# Patient Record
Sex: FEMALE | Race: BLACK OR AFRICAN AMERICAN | NOT HISPANIC OR LATINO | Employment: STUDENT | URBAN - NONMETROPOLITAN AREA
[De-identification: names, ages, dates, MRNs, and addresses within clinical notes are randomized per-mention and may not be internally consistent; named-entity substitution may affect disease eponyms.]

---
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
## 2025-01-29 ENCOUNTER — OFFICE VISIT (OUTPATIENT)
Dept: OBSTETRICS AND GYNECOLOGY | Facility: CLINIC | Age: 20
End: 2025-01-29
Payer: MEDICAID

## 2025-01-29 VITALS
BODY MASS INDEX: 33.09 KG/M2 | SYSTOLIC BLOOD PRESSURE: 127 MMHG | WEIGHT: 192.81 LBS | DIASTOLIC BLOOD PRESSURE: 86 MMHG | HEART RATE: 71 BPM

## 2025-01-29 DIAGNOSIS — N89.8 VAGINA ITCHING: ICD-10-CM

## 2025-01-29 DIAGNOSIS — Z20.2 STD EXPOSURE: ICD-10-CM

## 2025-01-29 DIAGNOSIS — Z11.3 SCREEN FOR STD (SEXUALLY TRANSMITTED DISEASE): Primary | ICD-10-CM

## 2025-01-29 PROCEDURE — 1159F MED LIST DOCD IN RCRD: CPT | Mod: CPTII,,, | Performed by: OBSTETRICS & GYNECOLOGY

## 2025-01-29 PROCEDURE — 3008F BODY MASS INDEX DOCD: CPT | Mod: CPTII,,, | Performed by: OBSTETRICS & GYNECOLOGY

## 2025-01-29 PROCEDURE — 3079F DIAST BP 80-89 MM HG: CPT | Mod: CPTII,,, | Performed by: OBSTETRICS & GYNECOLOGY

## 2025-01-29 PROCEDURE — 99213 OFFICE O/P EST LOW 20 MIN: CPT | Mod: S$PBB,,, | Performed by: OBSTETRICS & GYNECOLOGY

## 2025-01-29 PROCEDURE — 3074F SYST BP LT 130 MM HG: CPT | Mod: CPTII,,, | Performed by: OBSTETRICS & GYNECOLOGY

## 2025-01-29 NOTE — PROGRESS NOTES
[place] 19-year-old sexually active female who complains of increased discharge no odor no itching periods she has not taken any new medication on pelvic examination just a slight whitish discharge GC chlamydia called Trichomonas culture was was taken bimanual difficult but normal

## 2025-01-30 LAB
CHLAMYDIA: NEGATIVE
GONORRHEA: NEGATIVE
SOURCE: NORMAL
SOURCE: NORMAL
TRICHOMONAS AMPLIFIED: NEGATIVE

## 2025-07-09 ENCOUNTER — E-VISIT (OUTPATIENT)
Dept: URGENT CARE | Facility: CLINIC | Age: 20
End: 2025-07-09
Payer: MEDICAID

## 2025-07-09 DIAGNOSIS — N89.8 VAGINAL DISCHARGE: Primary | ICD-10-CM

## 2025-07-09 NOTE — PROGRESS NOTES
Patient ID: Nancy Hendricks is a 20 y.o. female.        E-Visit Time Tracking:   Day 1 Time (in minutes): 6  Total Time (in minutes): 6      Chief Complaint: General Illness (Entered automatically based on patient selection in [employer].)      The patient initiated a request through [employer] on 7/9/2025 for evaluation and management with a chief complaint of General Illness (Entered automatically based on patient selection in [employer].)     I evaluated the questionnaire submission on 07/09/2025.    [facility]    7/9/2025  7:19 AM CDT - Filed by Patient   What do you need help with? Other Concern   Do you agree to participate in an E-Visit? Yes   If you have any of the following symptoms, please go to the nearest emergency room or call 911: I acknowledge   Do you have any of the following pregnancy-related conditions? (Pregnant, Possibly pregnant, Breast feeding, None) None   What is the main issue you would like addressed today? i never have a smell down there and all of a sudden i do now. also my discharge is abnormal. is it possible for me to get a full pannel test scheduled?   Please describe your symptoms. abnormal discharge and a weird smell   Where is your problem located? vaginal area   On a scale of 1-10, where 10 is the worst you can imagine, how severe are your symptoms? (range: 1 - 10) 6   Have you had these symptoms before? No   How long have you been having these symptoms? (Just today, For a few days, For a week, For one to four weeks, For more than a month) About a week   What helps with your symptoms? havent tried anything yet because im not sure whats wrong   What makes your symptoms feel worse? not sure   Are these symptoms related to a condition that you currently have? (Yes, No, Not sure) Not sure   Please describe any probable cause for your symptoms. probably from having unprotected sex   Provide any information you feel is important to your history not asked above     Please attach any relevant images or files    Are you able to take your vitals? No         Encounter Diagnosis   Name Primary?    Vaginal discharge Yes        Orders Placed This Encounter   Procedures    Trichomonas vaginalis, RNA, Qual     Standing Status:   Future     Expected Date:   7/9/2025     Expiration Date:   10/7/2026    C. trachomatis/N. gonorrhoeae by AMP DNA     Standing Status:   Future     Expected Date:   7/9/2025     Expiration Date:   7/10/2026     Source::   Urine    HIV 1/2 Ag/Ab (4th Gen)     Standing Status:   Future     Expected Date:   7/9/2025     Expiration Date:   9/7/2026     Release to patient:   Immediate    Treponema Pallidium Antibodies IgG, IgM     Standing Status:   Future     Expected Date:   7/9/2025     Expiration Date:   10/7/2026            No follow-ups on file.

## 2025-07-11 ENCOUNTER — TELEPHONE (OUTPATIENT)
Dept: FAMILY MEDICINE | Facility: CLINIC | Age: 20
End: 2025-07-11
Payer: MEDICAID

## 2025-07-11 NOTE — TELEPHONE ENCOUNTER
Copied from CRM #[id number]. Topic: General Inquiry - Patient Advice  >> Jul 11, 2025  3:19 PM Matilde Mendez wrote:  Type:  Needs Medical Advice    Who Called: pt   Would the patient rather a call back or a response via [employer]? call  Best Call Back Number: [phone]  Additional Information: pt states that she needs her lab results fax to this number [phone] has been calling since 1pm

## 2025-07-11 NOTE — TELEPHONE ENCOUNTER
Copied from CRM #[id number]. Topic: General Inquiry - Patient Advice  >> Jul 11, 2025  2:55 PM Judith wrote:  Type: Needs Medical Advice  Who Called:  pt     Best Call Back Number: [phone]    Additional Information: pt requesting call back needing her lab orders sent to her dr office at [phone] , pt has been waiting an hour please advise

## 2025-07-11 NOTE — TELEPHONE ENCOUNTER
Copied from CRM #[id number]. Topic: General Inquiry - Patient Advice  >> Jul 11, 2025  2:36 PM Vicki wrote:  Type: Orders Request    What orders/ testing are being requested?  Labs    Is there a future appointment scheduled for the patient with PCP?   No    When?  N/A    Would you prefer a response via [employer]?  Call back    [phone] - [facility] in [place], [place]    Comments:  States she spoke with someone a little bit ago and is waiting on orders to be faxed over - states patient is there waiting - please call - thank you

## 2025-07-15 ENCOUNTER — OFFICE VISIT (OUTPATIENT)
Dept: FAMILY MEDICINE | Facility: CLINIC | Age: 20
End: 2025-07-15
Payer: MEDICAID

## 2025-07-15 VITALS
HEIGHT: 64 IN | OXYGEN SATURATION: 99 % | TEMPERATURE: 98 F | SYSTOLIC BLOOD PRESSURE: 130 MMHG | DIASTOLIC BLOOD PRESSURE: 70 MMHG | BODY MASS INDEX: 34.08 KG/M2 | HEART RATE: 81 BPM | WEIGHT: 199.63 LBS

## 2025-07-15 DIAGNOSIS — M79.89 LEFT LEG SWELLING: Primary | ICD-10-CM

## 2025-07-15 DIAGNOSIS — G47.00 INSOMNIA, UNSPECIFIED TYPE: ICD-10-CM

## 2025-07-15 PROCEDURE — 1160F RVW MEDS BY RX/DR IN RCRD: CPT | Mod: CPTII,,, | Performed by: NURSE PRACTITIONER

## 2025-07-15 PROCEDURE — 3008F BODY MASS INDEX DOCD: CPT | Mod: CPTII,,, | Performed by: NURSE PRACTITIONER

## 2025-07-15 PROCEDURE — 99213 OFFICE O/P EST LOW 20 MIN: CPT | Mod: ,,, | Performed by: NURSE PRACTITIONER

## 2025-07-15 PROCEDURE — 1159F MED LIST DOCD IN RCRD: CPT | Mod: CPTII,,, | Performed by: NURSE PRACTITIONER

## 2025-07-15 PROCEDURE — 3078F DIAST BP <80 MM HG: CPT | Mod: CPTII,,, | Performed by: NURSE PRACTITIONER

## 2025-07-15 PROCEDURE — G2211 COMPLEX E/M VISIT ADD ON: HCPCS | Mod: ,,, | Performed by: NURSE PRACTITIONER

## 2025-07-15 PROCEDURE — 3075F SYST BP GE 130 - 139MM HG: CPT | Mod: CPTII,,, | Performed by: NURSE PRACTITIONER

## 2025-07-15 RX ORDER — BUTALBITAL, ACETAMINOPHEN AND CAFFEINE 50; 325; 40 MG/1; MG/1; MG/1
1 CAPSULE ORAL
COMMUNITY
Start: 2024-11-12

## 2025-07-15 RX ORDER — HYDROXYZINE HYDROCHLORIDE 10 MG/1
10 TABLET, FILM COATED ORAL NIGHTLY PRN
Qty: 30 TABLET | Refills: 3 | Status: SHIPPED | OUTPATIENT
Start: 2025-07-15

## 2025-07-15 NOTE — PROGRESS NOTES
Patient ID: [id number]     Chief Complaint: Edema      Subjective     Nancy Hendricks is a 20 y.o. female in the office for Edema      History of Present Illness    CHIEF COMPLAINT:  Patient presents today for left leg swelling and discoloration.    VASCULAR CONCERNS:  She reports chronic left leg swelling persisting for approximately two years. The swelling fluctuates in severity but never fully resolves. The left foot experiences discoloration with a purple toe suggesting compromised circulation. She experiences intermittent pain in the back of the leg, exacerbated by walking or standing. Swelling temporarily improves with bed rest. She currently uses compression stockings for management. When swelling is significant, it causes discomfort and limits mobility. The edema pattern is characterized by persistent swelling that increases in size without significant volume reduction. She recently visited the ER on Saturday in July due to significant leg swelling that had persisted for about a week.    PERIPHERAL CIRCULATION:  She reports bilateral foot symptoms including purple discoloration of toes and cold sensations. She notes abnormal toe positioning, specifically describing a toe that appears bent downward and then curves back up. She denies warmth in the affected areas.    SLEEP:  She reports significant difficulty initiating sleep despite profound fatigue, obtaining only approximately 3 hours of sleep nightly. She describes having an active and racing mind when attempting to fall asleep. She has a history of using sleep medication when younger, which was previously managed with counseling, but currently takes no sleep-related medications.    ALLERGIES:  She reports worsening crawfish allergy symptoms. Previously managed with Benadryl, she now experiences hand swelling with white spots upon contact with crawfish. Due to escalating allergic response, she has decided to completely avoid crawfish.    VITAL  "SIGNS:  During her recent ER visit, her blood pressure was initially elevated, subsequently measuring low during the same encounter.      ROS:  ROS as indicated in HPI.         Past Medical History:  has a past medical history of Abnormal Pap smear of cervix, Anxiety, Impaired fasting glucose, Obesity, unspecified, and Obesity, unspecified.    Social History:  reports that she has never smoked. She has never been exposed to tobacco smoke. She has never used smokeless tobacco. She reports that she does not drink alcohol and does not use drugs.    Current Outpatient Medications   Medication Instructions    butalbital-acetaminophen-caff -40 mg -40 mg Cap 1 capsule, As needed (PRN)    hydrOXYzine HCL (ATARAX) 10 mg, Oral, Nightly PRN       Patient is allergic to shellfish containing products and iodine.     Patient Care Team:  Miroslava Welch FNP-C as PCP - General (Family Medicine)  [facility])  Verónica Phoenix WHNP as Nurse Practitioner (Obstetrics and Gynecology)     Objective     Visit Vitals  /70 (Patient Position: Sitting)   Pulse 81   Temp 97.5 °F (36.4 °C)   Ht 5' 4" (1.626 m)   Wt 90.5 kg (199 lb 9.6 oz)   LMP 2025   SpO2 99%   BMI 34.26 kg/m²       Physical Exam  Vitals reviewed.   Constitutional:       General: She is not in acute distress.  Cardiovascular:      Rate and Rhythm: Normal rate and regular rhythm.      Pulses: No decreased pulses.      Heart sounds: Normal heart sounds.   Pulmonary:      Effort: Pulmonary effort is normal.      Breath sounds: Normal breath sounds.   Musculoskeletal:      Left lower le+ Edema present.   Skin:     General: Skin is warm and dry.   Neurological:      Mental Status: She is alert and oriented to person, place, and time.   Psychiatric:         Mood and Affect: Mood normal.         Assessment & Plan     1. Left leg swelling  -     Ambulatory referral/consult to Vascular Surgery; Future; Expected date: 2025    2. Insomnia, " unspecified type  -     hydrOXYzine HCL (ATARAX) 10 MG Tab; Take 1 tablet (10 mg total) by mouth nightly as needed (sleep).  Dispense: 30 tablet; Refill: 3         Assessment & Plan    M79.89 Left leg swelling  G47.00 Insomnia, unspecified type    IMPRESSION:  - Assessed leg swelling and discoloration, noting chronic nature and recent exacerbation requiring hospital visit.  - Evaluated BP fluctuations reported from recent ER visit.  - Considered vascular issues as potential cause of leg symptoms and discoloration.  - Determined need for further cardiovascular evaluation.  - Confirmed likely allergic reaction to crawfish.    M79.89 LEFT LEG SWELLING:  - Discussed benefits of compression stockings for leg swelling and patient to continue wearing them.  - Referred to vascular surgery for further evaluation of leg symptoms with follow up appointment in approximately 2 weeks.  - Patient to contact office if no one calls to schedule vascular surgery appointment within 2 weeks.  - Also referred back to cardiology for cardiovascular evaluation.    G47.00 INSOMNIA, UNSPECIFIED TYPE:  - Started hydroxyzine for sleep difficulties.  - Patient to contact office if medication does not help.        Again, referring to cardiology for workup venous insufficiency, rule out May Campbell syndrome.  She is a family history of early CAD.  Again encouraged compression stockings      Follow up if symptoms worsen or fail to improve. In addition to their next scheduled appointment, the patient has also been instructed to follow up on as needed basis.     Future Appointments   Date Time Provider Department Center   7/29/2025  9:30 AM Verónica Phoenix WHNP [facility]        This note was generated with the assistance of ambient listening technology. Verbal consent was obtained by the patient and accompanying visitor(s) for the recording of patient appointment to facilitate this note. I attest to having reviewed and edited the generated  note for accuracy, though some syntax or spelling errors may persist. Please contact the author of this note for any clarification.

## 2025-07-16 ENCOUNTER — PATIENT MESSAGE (OUTPATIENT)
Dept: FAMILY MEDICINE | Facility: CLINIC | Age: 20
End: 2025-07-16
Payer: MEDICAID

## 2025-07-16 ENCOUNTER — E-VISIT (OUTPATIENT)
Dept: URGENT CARE | Facility: CLINIC | Age: 20
End: 2025-07-16
Payer: MEDICAID

## 2025-07-16 DIAGNOSIS — R21 RASH: ICD-10-CM

## 2025-07-16 DIAGNOSIS — L70.0 ACNE VULGARIS: Primary | ICD-10-CM

## 2025-07-16 RX ORDER — DOXYCYCLINE 100 MG/1
100 CAPSULE ORAL EVERY 12 HOURS
Qty: 20 CAPSULE | Refills: 0 | Status: SHIPPED | OUTPATIENT
Start: 2025-07-16 | End: 2025-07-26

## 2025-07-16 RX ORDER — CLINDAMYCIN PHOSPHATE AND BENZOYL PEROXIDE 10; 50 MG/G; MG/G
GEL TOPICAL
Qty: 45 G | Refills: 0 | Status: SHIPPED | OUTPATIENT
Start: 2025-07-16 | End: 2026-07-16

## 2025-07-16 NOTE — PROGRESS NOTES
Patient ID: Nancy Hendricks is a 20 y.o. female.        E-Visit Time Tracking:   Day 1 Time (in minutes): 12  Total Time (in minutes): 12      Chief Complaint: General Illness (Entered automatically based on patient selection in [employer].)      The patient initiated a request through [employer] on 7/16/2025 for evaluation and management with a chief complaint of General Illness (Entered automatically based on patient selection in [employer].)     I evaluated the questionnaire submission on 07/16/2025.    [facility]    7/16/2025  9:04 AM CDT - Filed by Patient   What do you need help with? Skin   What concern do you have about your skin? Itching   Do you agree to participate in an E-Visit? Yes   If you have any of the following symptoms, please present to your local emergency room or call 911:  I acknowledge   Do you have any of the following pregnancy-related conditions? (Pregnant, Possibly pregnant, Breast feeding, None) None   What is the main issue you would like addressed today? face itching/burning and breaking out with tiny bumps   How would you describe your skin concern? Rash   When did your concern begin? 7/14/2025   Where is your skin concern located? (Scalp, Face, Neck, Chest, Back, Arm(s), Hand(s), Groin, Genitals, Buttock/anus, Leg(s), Foot/Feet, Sun exposed areas, Clothes covered areas, Other) Face   Does the affected area itch? Yes   Does the affected area hurt? No   Does the affected area have discharge or drainage? No   Have you noticed any bleeding in the affected area? No   How would you describe your skin concern? (Spots, Streaks, Raised, Flat, Scaly, Blistered, Solid or firm, Open, Closed, Clear fluid filled, Pus filled, Draining, Scabs) Flat   How would you describe the color of the affected area(s)? (No change, Black, Blue, Brown, Green, Orange, Pink, Purple, Red, Yellow, White, Darker than skin, Lighter than skin) Red   How has the affected area changed over time? (No  change, Increased in size, Decreased in size, Size changes randomly, Spread to other areas, Began in multiple areas, now in one) Spread to other areas   How often do you have this skin concern? (New problem, Always, Comes and goes, Weekly, Monthly, Yearly, Seasonally) New problem   How long does your skin problem last? (Always, Minutes, Hours, Days, Weeks, Months, Years) Days   Have you been Exposed to any of the following? (Animals, Chemicals, Cosmetics, Creams/lotions, Detergent, Excessive sun, Foods, Insect, Laser, shaving, waxing, Meds, Perfumes, Poison ivy/oak, Sick contact, Soap, Sexual contact, Other, Not sure, None) Creams or lotions;  Soap   Have you used any of the following to treat your skin concern? (Over-the-counter cream or ointment, Prescription medication, Home remedies, Cold compress, Heat compress, Steroid cream or ointment, Antibiotics, Moisturizer or lotion, Other, None) None   Do you have any of the following additional symptoms with your skin concern? None   Provide any information you feel is important to your history not asked above burns really bad and the bumps itch   At least one photo is required for treatment to be provided. You can upload a maximum of three photos of the affected area.     Are you able to take your vitals? No         Encounter Diagnoses   Name Primary?    Acne vulgaris Yes    Rash         No orders of the defined types were placed in this encounter.     Medications Ordered This Encounter   Medications    clindamycin-benzoyl peroxide gel     Sig: Apply pea-sized amount to affected area once daily     Dispense:  45 g     Refill:  0    doxycycline (VIBRAMYCIN) 100 MG Cap     Sig: Take 1 capsule (100 mg total) by mouth every 12 (twelve) hours. for 10 days     Dispense:  20 capsule     Refill:  0        No follow-ups on file.

## 2025-07-21 ENCOUNTER — TELEPHONE (OUTPATIENT)
Dept: FAMILY MEDICINE | Facility: CLINIC | Age: 20
End: 2025-07-21
Payer: MEDICAID

## 2025-07-21 ENCOUNTER — OFFICE VISIT (OUTPATIENT)
Dept: OBSTETRICS AND GYNECOLOGY | Facility: CLINIC | Age: 20
End: 2025-07-21
Payer: MEDICAID

## 2025-07-21 VITALS
WEIGHT: 199 LBS | SYSTOLIC BLOOD PRESSURE: 140 MMHG | DIASTOLIC BLOOD PRESSURE: 80 MMHG | HEART RATE: 100 BPM | BODY MASS INDEX: 34.16 KG/M2

## 2025-07-21 DIAGNOSIS — N89.8 VAGINAL LESION: Primary | ICD-10-CM

## 2025-07-21 PROCEDURE — 1159F MED LIST DOCD IN RCRD: CPT | Mod: CPTII,,, | Performed by: OBSTETRICS & GYNECOLOGY

## 2025-07-21 PROCEDURE — 99213 OFFICE O/P EST LOW 20 MIN: CPT | Mod: S$PBB,,, | Performed by: OBSTETRICS & GYNECOLOGY

## 2025-07-21 PROCEDURE — 3077F SYST BP >= 140 MM HG: CPT | Mod: CPTII,,, | Performed by: OBSTETRICS & GYNECOLOGY

## 2025-07-21 PROCEDURE — 3079F DIAST BP 80-89 MM HG: CPT | Mod: CPTII,,, | Performed by: OBSTETRICS & GYNECOLOGY

## 2025-07-21 PROCEDURE — 3008F BODY MASS INDEX DOCD: CPT | Mod: CPTII,,, | Performed by: OBSTETRICS & GYNECOLOGY

## 2025-07-21 NOTE — PROGRESS NOTES
Subjective     Patient ID: Nancy Hendricks is a 20 y.o. female.    Chief Complaint:  No chief complaint on file.      History of Present Illness  Gynecologic Exam  The patient's primary symptoms include a genital rash and vaginal discharge. The patient's pertinent negatives include no genital itching, genital odor, missed menses, pelvic pain or vaginal bleeding. This is a new problem. The current episode started today. The problem has been unchanged. The patient is experiencing no pain. The problem affects the left side. She is not pregnant. Associated symptoms include rash. Pertinent negatives include no abdominal pain, anorexia, back pain, chills, constipation, diarrhea, discolored urine, dysuria, fever, flank pain, frequency, headaches, hematuria, nausea, painful intercourse, urgency or vomiting. The vaginal discharge was scant. There has been no bleeding. She has not been passing clots. She has not been passing tissue. Nothing aggravates the symptoms. She has tried nothing for the symptoms. The treatment provided no relief. She is not sexually active. It is unknown whether or not her partner has an STD. She uses nothing for contraception. Her menstrual history has been regular. There is no history of an abdominal surgery, a  section, an ectopic pregnancy, endometriosis, a gynecological surgery, herpes simplex, menorrhagia, metrorrhagia, miscarriage, ovarian cysts, perineal abscess, PID, an STD, a terminated pregnancy or vaginosis.     Vag sore after waxing    GYN & OB History  Patient's last menstrual period was 2025.   Date of Last Pap: No result found    OB History    Para Term  AB Living   0 0 0 0 0 0   SAB IAB Ectopic Multiple Live Births   0 0 0 0 0       Review of Systems  Review of Systems   Constitutional:  Negative for chills and fever.   Gastrointestinal:  Negative for abdominal pain, anorexia, constipation, diarrhea, nausea and vomiting.   Genitourinary:  Positive for  vaginal discharge. Negative for dysuria, flank pain, frequency, hematuria, menorrhagia, missed menses, pelvic pain and urgency.   Musculoskeletal:  Negative for back pain.   Integumentary:  Positive for rash.   Neurological:  Negative for headaches.          Objective   Physical Exam:   Constitutional: She appears well-developed and well-nourished. No distress.    HENT:   Head: Normocephalic.    Eyes: Conjunctivae and EOM are normal.    Neck: No tracheal deviation present. No thyromegaly present.    Cardiovascular:       Exam reveals no clubbing, no cyanosis and no edema.        Pulmonary/Chest: Effort normal. No respiratory distress.                  Musculoskeletal: Normal range of motion and moves all extremeties.        Skin: No rash noted. She is not diaphoretic. No cyanosis. Nails show no clubbing.    Psychiatric: She has a normal mood and affect. Her behavior is normal. Judgment and thought content normal.       Small lesion on vulva- not consistent with hsv     Assessment and Plan     1. Vaginal lesion             Plan:  Hsv cx

## 2025-07-22 DIAGNOSIS — N89.8 VAGINAL LESION: Primary | ICD-10-CM

## 2025-07-22 LAB
HSV 1 DNA: NEGATIVE
HSV 2 DNA: POSITIVE
SPECIMEN SITE: ABNORMAL

## 2025-07-22 RX ORDER — VALACYCLOVIR HYDROCHLORIDE 1 G/1
1000 TABLET, FILM COATED ORAL 2 TIMES DAILY
Qty: 14 TABLET | Refills: 6 | Status: SHIPPED | OUTPATIENT
Start: 2025-07-22 | End: 2025-07-27

## 2025-08-29 ENCOUNTER — TELEPHONE (OUTPATIENT)
Dept: FAMILY MEDICINE | Facility: CLINIC | Age: 20
End: 2025-08-29
Payer: MEDICAID

## 2025-08-29 DIAGNOSIS — M79.89 LEFT LEG SWELLING: Primary | ICD-10-CM
